# Patient Record
Sex: MALE | URBAN - METROPOLITAN AREA
[De-identification: names, ages, dates, MRNs, and addresses within clinical notes are randomized per-mention and may not be internally consistent; named-entity substitution may affect disease eponyms.]

---

## 2019-06-20 ENCOUNTER — OFFICE VISIT (OUTPATIENT)
Dept: URGENT CARE | Facility: URGENT CARE | Age: 29
End: 2019-06-20

## 2019-06-20 VITALS
DIASTOLIC BLOOD PRESSURE: 80 MMHG | WEIGHT: 168 LBS | HEART RATE: 71 BPM | SYSTOLIC BLOOD PRESSURE: 122 MMHG | OXYGEN SATURATION: 99 % | TEMPERATURE: 96.5 F

## 2019-06-20 DIAGNOSIS — R35.0 INCREASED URINARY FREQUENCY: ICD-10-CM

## 2019-06-20 DIAGNOSIS — R10.31 RLQ ABDOMINAL PAIN: Primary | ICD-10-CM

## 2019-06-20 LAB
ALBUMIN UR-MCNC: NEGATIVE MG/DL
APPEARANCE UR: CLEAR
BASOPHILS # BLD AUTO: 0 10E9/L (ref 0–0.2)
BASOPHILS NFR BLD AUTO: 0.1 %
BILIRUB UR QL STRIP: NEGATIVE
COLOR UR AUTO: YELLOW
DIFFERENTIAL METHOD BLD: NORMAL
EOSINOPHIL # BLD AUTO: 0.1 10E9/L (ref 0–0.7)
EOSINOPHIL NFR BLD AUTO: 0.8 %
ERYTHROCYTE [DISTWIDTH] IN BLOOD BY AUTOMATED COUNT: 12 % (ref 10–15)
GLUCOSE BLD-MCNC: 81 MG/DL (ref 70–99)
GLUCOSE UR STRIP-MCNC: NEGATIVE MG/DL
HCT VFR BLD AUTO: 48.9 % (ref 40–53)
HGB BLD-MCNC: 16.7 G/DL (ref 13.3–17.7)
HGB UR QL STRIP: ABNORMAL
KETONES UR STRIP-MCNC: NEGATIVE MG/DL
LEUKOCYTE ESTERASE UR QL STRIP: NEGATIVE
LYMPHOCYTES # BLD AUTO: 3.6 10E9/L (ref 0.8–5.3)
LYMPHOCYTES NFR BLD AUTO: 42 %
MCH RBC QN AUTO: 30.7 PG (ref 26.5–33)
MCHC RBC AUTO-ENTMCNC: 34.2 G/DL (ref 31.5–36.5)
MCV RBC AUTO: 90 FL (ref 78–100)
MONOCYTES # BLD AUTO: 0.6 10E9/L (ref 0–1.3)
MONOCYTES NFR BLD AUTO: 6.4 %
NEUTROPHILS # BLD AUTO: 4.3 10E9/L (ref 1.6–8.3)
NEUTROPHILS NFR BLD AUTO: 50.7 %
NITRATE UR QL: NEGATIVE
PH UR STRIP: 7 PH (ref 5–7)
PLATELET # BLD AUTO: 226 10E9/L (ref 150–450)
RBC # BLD AUTO: 5.44 10E12/L (ref 4.4–5.9)
RBC #/AREA URNS AUTO: NORMAL /HPF
SOURCE: ABNORMAL
SP GR UR STRIP: 1.02 (ref 1–1.03)
UROBILINOGEN UR STRIP-ACNC: 1 EU/DL (ref 0.2–1)
WBC # BLD AUTO: 8.5 10E9/L (ref 4–11)
WBC #/AREA URNS AUTO: NORMAL /HPF

## 2019-06-20 PROCEDURE — 82947 ASSAY GLUCOSE BLOOD QUANT: CPT | Performed by: PHYSICIAN ASSISTANT

## 2019-06-20 PROCEDURE — 85025 COMPLETE CBC W/AUTO DIFF WBC: CPT | Performed by: PHYSICIAN ASSISTANT

## 2019-06-20 PROCEDURE — 36415 COLL VENOUS BLD VENIPUNCTURE: CPT | Performed by: PHYSICIAN ASSISTANT

## 2019-06-20 PROCEDURE — 81001 URINALYSIS AUTO W/SCOPE: CPT | Performed by: PHYSICIAN ASSISTANT

## 2019-06-20 PROCEDURE — 99213 OFFICE O/P EST LOW 20 MIN: CPT | Performed by: PHYSICIAN ASSISTANT

## 2019-06-21 NOTE — PATIENT INSTRUCTIONS
Patient Education     What Is Appendicitis?    Your side may hurt so much that you called your healthcare provider. Or maybe you went straight to the hospital emergency room. If the symptoms came on quickly, you may have appendicitis. This is an infection of the appendix. Surgery can remove the infection and relieve your symptoms. Read on to learn more.  Your appendix  The appendix is a hollow structure about the size of your little finger. It opens off the colon (large intestine). The purpose of the appendix is unclear. But if it becomes blocked, it may become infected.  Symptoms of appendicitis  Symptoms tend to appear quickly, often over a day or two. Symptoms can include:    Pain that starts in the center of your belly and moves to your lower right side    Increased pain and pressure on your side when you walk    Vomiting, nausea, or decreased appetite    Fever or fatigue    Either diarrhea or constipation  How surgery helps  Medicine can t cure appendicitis. Surgery is needed to remove an infected appendix (an appendectomy). This is a very common procedure. Removing the appendix should not affect your long-term health. It s best to remove the appendix before it bursts. If an infected or burst appendix is not removed, it can cause severe health problems.   Date Last Reviewed: 7/1/2016 2000-2018 [employer]. [address] [place] [id number]. All rights reserved. This information is not intended as a substitute for professional medical care. Always follow your healthcare professional's instructions.    6/20/19 Urgent Care:     I do not know exactly what is causing your right lower quadrant pain.     I screened you for bladder infection, kidney infection, kidney stones and diabetes today. All of these test results are normal.     Your symptoms of pain is in the right lower abdomen, decreased appetite and nausea can be caused by appendicitis. Sometimes  men get urinary symptoms associated  with appendicitis as well.     I advise you go to the emergency room now for further evaluation of possible appendicitis.     Please bring a copy of the lab test results I gave you from today's visit to the emergency room.     Please do not eat or drink anything until the emergency room doctor evaluates you.

## 2019-06-21 NOTE — PROGRESS NOTES
"    SUBJECTIVE:    Abel Son is a self-reported, otherwise healthy, 28 y.o.  Male who presents  Patient reports he developed RLQ pain 3 days ago. Pain is now estimated at a 2/10 all day today.     Of note, patient is offered a professional medical phone , twice, by me today but he declines. He has a friend, who is also a co-worker with him now and prefers to have friend assist in interpreting. I advised it is my preference to have professional  but patient declines.     HPI: Patient reports he developed unexplained RLQ pain 3 days ago. He states, \"Something feels inflamed inside me.\"  Increased frequency x 3 days. Denies any dysuria. No hematuria. No fever or chills. Positive right sided flank pain. Denies any past hx of bladder or kidney infection. Denies any personal hx of kidney stones.     Associated: Positive for decreased appetite. Sometimes has nausea after eating but no vomiting. No diarrhea. Last BM yesterday (no black or bloody stool)         ROS:     RESP:Denies any CP or SOB  CARDIAC: Denies CP or SOB  GI: : Positive for RLQ pain and decreased appetite x 3 days. Sometimes has nausea after eating but no vomiting. Denies any V/D. No abdominal pain. Normal BM's  GENITOURINARY: : Patient states he is urinating as frequently as 4x/hour during daytime. States urine is light yellow in color. He denies any hematuria or dark, cola colored urine. Not sexually active for past one year. He declines offer for STD screening--states he has no concern about STD (has not been SA for 1+ years)  SKIN: Denies rash  NEURO: Positive fever as noted above. Positive mild, waxing and waning generalized HA as per above. Denies any severe headaches, neck stiffness, photophobia, rash, mental status changes or lethargy.   ENDOCRINE: Denies any past hx of DM       PMHX: States he is otherwise healthy. Denies any chronic medical hx.    PSHX: no past GI surgery. No prior surgeries of any kind. "   FMHX: Brother has kidney stones. No family hx of diabetes    SOCIAL: Single. Works at [employer]     No current outpatient medications on file.     No current facility-administered medications for this visit.            OBJECTIVE:  /80 (BP Location: Right arm)   Pulse 71   Temp 96.5  F (35.8  C) (Tympanic)   Wt 76.2 kg (168 lb)   SpO2 99%     General appearance: alert and no apparent distress but appears uncomfortable   Skin color is uniform in color and without rash.  HEENT:   Conjunctiva not injected.  Sclera clear.  Left TM is normal: no effusions, no erythema, and normal landmarks.  Right TM is normal: no effusions, no erythema, and normal landmarks.  Nasal mucosa is normal.  Oropharyngeal exam is normal: no lesions, erythema, adenopathy or exudate.  Neck is supple with no adenopathy  CARDIAC:NORMAL - regular rate and rhythm without murmur.  RESP: Normal - CTA without rales, rhonchi, or wheezing.  ABDOMEN: Positive for mild, focal, RLQ pain over McBurney's Point on exam. No guarding or rebound tenderness. Abdomen soft and non-tender elsewhere. BS normal. No masses, organomegaly    Results for orders placed or performed in visit on 06/20/19   CBC with platelets differential   Result Value Ref [facility]    WBC 8.5 4.0 - 11.0 10e9/L    RBC Count 5.44 4.4 - 5.9 10e12/L    Hemoglobin 16.7 13.3 - 17.7 g/dL    Hematocrit 48.9 40.0 - 53.0 %    MCV 90 78 - 100 fl    MCH 30.7 26.5 - 33.0 pg    MCHC 34.2 31.5 - 36.5 g/dL    RDW 12.0 10.0 - 15.0 %    Platelet Count 226 150 - 450 10e9/L    Diff Method Automated Method     % Neutrophils 50.7 %    % Lymphocytes 42.0 %    % Monocytes 6.4 %    % Eosinophils 0.8 %    % Basophils 0.1 %    Absolute Neutrophil 4.3 1.6 - 8.3 10e9/L    Absolute Lymphocytes 3.6 0.8 - 5.3 10e9/L    Absolute Monocytes 0.6 0.0 - 1.3 10e9/L    Absolute Eosinophils 0.1 0.0 - 0.7 10e9/L    Absolute Basophils 0.0 0.0 - 0.2 10e9/L   *UA reflex to Microscopic and Culture ([facility]  (except [place] and [place])   Result Value Ref [facility]    Color Urine Yellow     Appearance Urine Clear     Glucose Urine Negative NEG^Negative mg/dL    Bilirubin Urine Negative NEG^Negative    Ketones Urine Negative NEG^Negative mg/dL    Specific Gravity Urine 1.020 1.003 - 1.035    Blood Urine Trace (A) NEG^Negative    pH Urine 7.0 5.0 - 7.0 pH    Protein Albumin Urine Negative NEG^Negative mg/dL    Urobilinogen Urine 1.0 0.2 - 1.0 EU/dL    Nitrite Urine Negative NEG^Negative    Leukocyte Esterase Urine Negative NEG^Negative    Source Midstream Urine    Glucose, whole blood   Result Value Ref [facility]    Glucose Whole Blood 81 70 - 99 mg/dL   Urine Microscopic   Result Value Ref [facility]    WBC Urine 0 - 5 OTO5^0 - 5 /HPF    RBC Urine O - 2 OTO2^O - 2 /HPF         ASSESSMENT/PLAN:    (R10.31) RLQ abdominal pain  (primary encounter diagnosis)  MDM: Initial differential dx is large and includes but is not limited to acute appendicitis (of which I am medically suspicious). Patient does not have report severe RLQ pain but he appears uncomfortable on palpation of RLQ and has focal tenderness directly over McBurney's Point and has no abdominal pain elsewhere. He also has acute anorexia and intermittent nausea (without vomiting or diarrhea). Bladder infection and kidney infection are potential etiologies (however there is no evidence of this on UA). Kidney stone is potential etiology but patient has no flank pain and no hematuria on lab testing. Patient is also screened for DM today.  We progressed though today's testing in stages at patient's desire due to his current lack of medical insurance and his, understandable, concern for cost. I did not pursue additional comprehensive metabolic testing and abdominal x-ray as I advised I am suspicious for acute appendicitis and advise he be seen in ER or UR now for imaging and further assessment of acute appendicitis. A copy of all today's lab test results are reviewed with patient  and provided for him to hand carry in hopes this will reduce cost of ER visit some for him. Patient verbalizes understanding of the above and agrees to this plan. Patient is educated about risk for severe peritoneal infection if appendix were infected, left untreated and ruptured. He is directed to remain NPO. Friend, with him now will take him to ER. They were going to check UR pricing ( I provided phone number and address for patient to call) from car before deciding on UR vs ER (I provided [facility] phone number and address).       See below text for patient discharge instructions which I reviewed personally in verbal form and which I provided in printed form. Additionally, I provided appendicitis information in [place]ian (his first language).       Plan: CBC with platelets differential, *UA reflex to         Microscopic and Culture ([facility] [place]         [facility] (except [place])        6/20/19 Urgent Care:     I do not know exactly what is causing your right lower quadrant pain.     I screened you for bladder infection, kidney infection, kidney stones and diabetes today. All of these test results are normal.     Your symptoms of pain is in the right lower abdomen, decreased appetite and nausea can be caused by appendicitis. Sometimes  men get urinary symptoms associated with appendicitis as well.     I advise you go to the emergency room now for further evaluation of possible appendicitis.     Please bring a copy of the lab test results I gave you from today's visit to the emergency room.     Please do not eat or drink anything until the emergency room doctor evaluates you.     (R35.0) Increased urinary frequency  Plan: CBC with platelets differential, *UA reflex to         Microscopic and Culture ([facility] [place]         [facility] (except [place] and [place]),         Glucose, whole blood, Urine Microscopic

## 2021-10-19 ENCOUNTER — APPOINTMENT (OUTPATIENT)
Dept: INTERPRETER SERVICES | Facility: CLINIC | Age: 31
End: 2021-10-19